# Patient Record
Sex: MALE | Race: BLACK OR AFRICAN AMERICAN | NOT HISPANIC OR LATINO | Employment: FULL TIME | URBAN - METROPOLITAN AREA
[De-identification: names, ages, dates, MRNs, and addresses within clinical notes are randomized per-mention and may not be internally consistent; named-entity substitution may affect disease eponyms.]

---

## 2020-06-12 ENCOUNTER — HOSPITAL ENCOUNTER (EMERGENCY)
Facility: HOSPITAL | Age: 35
Discharge: HOME | End: 2020-06-12
Attending: EMERGENCY MEDICINE
Payer: COMMERCIAL

## 2020-06-12 VITALS
DIASTOLIC BLOOD PRESSURE: 79 MMHG | BODY MASS INDEX: 24.96 KG/M2 | OXYGEN SATURATION: 97 % | HEIGHT: 67 IN | SYSTOLIC BLOOD PRESSURE: 118 MMHG | WEIGHT: 159 LBS | HEART RATE: 74 BPM | RESPIRATION RATE: 18 BRPM

## 2020-06-12 DIAGNOSIS — F31.9 BIPOLAR AFFECTIVE DISORDER, REMISSION STATUS UNSPECIFIED (CMS/HCC): Primary | ICD-10-CM

## 2020-06-12 DIAGNOSIS — R45.851 SUICIDAL IDEATION: ICD-10-CM

## 2020-06-12 LAB
ALBUMIN SERPL-MCNC: 4.4 G/DL (ref 3.4–5)
ALP SERPL-CCNC: 43 IU/L (ref 35–126)
ALT SERPL-CCNC: 19 IU/L (ref 16–63)
AMPHET UR QL SCN: NOT DETECTED
ANION GAP SERPL CALC-SCNC: 8 MEQ/L (ref 3–15)
APAP SERPL-MCNC: <10 UG/ML (ref 10–30)
AST SERPL-CCNC: 19 IU/L (ref 15–41)
BARBITURATES UR QL SCN: NOT DETECTED
BASOPHILS # BLD: 0 K/UL (ref 0.01–0.1)
BASOPHILS NFR BLD: 0 %
BENZODIAZ UR QL SCN: NOT DETECTED
BILIRUB SERPL-MCNC: 1 MG/DL (ref 0.3–1.2)
BUN SERPL-MCNC: 8 MG/DL (ref 8–20)
CALCIUM SERPL-MCNC: 9.3 MG/DL (ref 8.9–10.3)
CANNABINOIDS UR QL SCN: NOT DETECTED
CHLORIDE SERPL-SCNC: 107 MEQ/L (ref 98–109)
CO2 SERPL-SCNC: 23 MEQ/L (ref 22–32)
COCAINE UR QL SCN: NOT DETECTED
CREAT SERPL-MCNC: 0.9 MG/DL (ref 0.8–1.3)
DIFFERENTIAL METHOD BLD: ABNORMAL
EOSINOPHIL # BLD: 0.03 K/UL (ref 0.04–0.54)
EOSINOPHIL NFR BLD: 1 %
ERYTHROCYTE [DISTWIDTH] IN BLOOD BY AUTOMATED COUNT: 11.1 % (ref 11.6–14.4)
ETHANOL SERPL-MCNC: <5 MG/DL
GFR SERPL CREATININE-BSD FRML MDRD: >60 ML/MIN/1.73M*2
GIANT PLATELETS BLD QL SMEAR: ABNORMAL
GLUCOSE SERPL-MCNC: 96 MG/DL (ref 70–99)
HCT VFR BLDCO AUTO: 46.1 % (ref 40.1–51)
HGB BLD-MCNC: 15.8 G/DL (ref 13.7–17.5)
HYPOCHROMIA BLD QL SMEAR: ABNORMAL
LYMPHOCYTES # BLD: 1.28 K/UL (ref 1.2–3.5)
LYMPHOCYTES NFR BLD: 45 %
MCH RBC QN AUTO: 31.9 PG (ref 28–33.2)
MCHC RBC AUTO-ENTMCNC: 34.3 G/DL (ref 32.2–36.5)
MCV RBC AUTO: 93.1 FL (ref 83–98)
MONOCYTES # BLD: 0.28 K/UL (ref 0.3–1)
MONOCYTES NFR BLD: 10 %
NEUTS BAND # BLD: 1.22 K/UL (ref 1.7–7)
NEUTS SEG NFR BLD: 43 %
OPIATES UR QL SCN: NOT DETECTED
PCP UR QL SCN: NOT DETECTED
PDW BLD AUTO: 9.4 FL (ref 9.4–12.4)
PLAT MORPH BLD: NORMAL
PLATELET # BLD AUTO: 190 K/UL (ref 150–350)
PLATELET # BLD EST: ABNORMAL 10*3/UL
POTASSIUM SERPL-SCNC: 3.5 MEQ/L (ref 3.6–5.1)
PROT SERPL-MCNC: 7.4 G/DL (ref 6–8.2)
RBC # BLD AUTO: 4.95 M/UL (ref 4.5–5.8)
SALICYLATES SERPL-MCNC: <4 MG/DL
SODIUM SERPL-SCNC: 138 MEQ/L (ref 136–144)
STOMATOCYTES BLD QL SMEAR: ABNORMAL
VARIANT LYMPHS NFR BLD: 1 %
WBC # BLD AUTO: 2.84 K/UL (ref 3.8–10.5)

## 2020-06-12 PROCEDURE — 80053 COMPREHEN METABOLIC PANEL: CPT | Performed by: PHYSICIAN ASSISTANT

## 2020-06-12 PROCEDURE — 80307 DRUG TEST PRSMV CHEM ANLYZR: CPT | Performed by: PHYSICIAN ASSISTANT

## 2020-06-12 PROCEDURE — 85025 COMPLETE CBC W/AUTO DIFF WBC: CPT | Performed by: PHYSICIAN ASSISTANT

## 2020-06-12 PROCEDURE — 99283 EMERGENCY DEPT VISIT LOW MDM: CPT

## 2020-06-12 PROCEDURE — 36415 COLL VENOUS BLD VENIPUNCTURE: CPT | Performed by: PHYSICIAN ASSISTANT

## 2020-06-12 PROCEDURE — G0480 DRUG TEST DEF 1-7 CLASSES: HCPCS | Performed by: PHYSICIAN ASSISTANT

## 2020-06-12 RX ORDER — TOPIRAMATE 100 MG/1
100 TABLET, FILM COATED ORAL
COMMUNITY
Start: 2020-04-23

## 2020-06-12 RX ORDER — TOPIRAMATE 25 MG/1
25 TABLET ORAL
COMMUNITY
Start: 2020-05-05

## 2020-06-12 RX ORDER — SUMATRIPTAN SUCCINATE 25 MG/1
TABLET ORAL
COMMUNITY
Start: 2020-05-05

## 2020-06-12 ASSESSMENT — ENCOUNTER SYMPTOMS
WHEEZING: 0
SEIZURES: 0
BACK PAIN: 0
DIAPHORESIS: 0
SPEECH DIFFICULTY: 0
SORE THROAT: 0
FACIAL SWELLING: 0
AGITATION: 0
NAUSEA: 0
NECK PAIN: 0
HEADACHES: 0
VOMITING: 0
COUGH: 0
ACTIVITY CHANGE: 0
COLOR CHANGE: 0
SHORTNESS OF BREATH: 0
HEMATURIA: 0
ABDOMINAL PAIN: 0
DIFFICULTY URINATING: 0
FEVER: 0
WEAKNESS: 0
DIARRHEA: 0
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
## 2020-06-13 LAB — PATH REV BLD -IMP: NORMAL

## 2020-06-13 NOTE — DISCHARGE INSTRUCTIONS
You have contracted for safety in the emergency department.  This means that you have expressed that you are not currently suicidal and you are not currently planning on harming yourself.  If this changes at any time, please call 911 or make a family member aware.  Return to the ER at any time.
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
## 2020-06-23 ENCOUNTER — HOSPITAL ENCOUNTER (INPATIENT)
Age: 35
LOS: 6 days | Discharge: HOME | DRG: 885 | End: 2020-06-29
Attending: EMERGENCY MEDICINE | Admitting: PSYCHIATRY & NEUROLOGY
Payer: COMMERCIAL

## 2020-06-23 DIAGNOSIS — F31.11 BIPOLAR AFFECTIVE DISORDER, CURRENTLY MANIC, MILD (HCC): ICD-10-CM

## 2020-06-23 DIAGNOSIS — R45.4 DIFFICULTY CONTROLLING ANGER: Primary | ICD-10-CM

## 2020-06-23 PROBLEM — F31.9 BIPOLAR DISORDER (HCC): Status: ACTIVE | Noted: 2020-06-23

## 2020-06-23 LAB
ALBUMIN SERPL-MCNC: 3.9 G/DL (ref 3.4–5)
ALBUMIN/GLOB SERPL: 1.1 {RATIO} (ref 0.8–1.7)
ALP SERPL-CCNC: 53 U/L (ref 45–117)
ALT SERPL-CCNC: 21 U/L (ref 16–61)
AMPHET UR QL SCN: NEGATIVE
ANION GAP SERPL CALC-SCNC: 4 MMOL/L (ref 3–18)
APAP SERPL-MCNC: <2 UG/ML (ref 10–30)
AST SERPL-CCNC: 14 U/L (ref 10–38)
BARBITURATES UR QL SCN: NEGATIVE
BASOPHILS # BLD: 0 K/UL (ref 0–0.1)
BASOPHILS NFR BLD: 1 % (ref 0–2)
BENZODIAZ UR QL: NEGATIVE
BILIRUB SERPL-MCNC: 0.4 MG/DL (ref 0.2–1)
BUN SERPL-MCNC: 10 MG/DL (ref 7–18)
BUN/CREAT SERPL: 12 (ref 12–20)
CALCIUM SERPL-MCNC: 8.8 MG/DL (ref 8.5–10.1)
CANNABINOIDS UR QL SCN: NEGATIVE
CHLORIDE SERPL-SCNC: 106 MMOL/L (ref 100–111)
CO2 SERPL-SCNC: 30 MMOL/L (ref 21–32)
COCAINE UR QL SCN: NEGATIVE
COVID-19 RAPID TEST, COVR: NOT DETECTED
CREAT SERPL-MCNC: 0.84 MG/DL (ref 0.6–1.3)
DIFFERENTIAL METHOD BLD: ABNORMAL
EOSINOPHIL # BLD: 0.2 K/UL (ref 0–0.4)
EOSINOPHIL NFR BLD: 5 % (ref 0–5)
ERYTHROCYTE [DISTWIDTH] IN BLOOD BY AUTOMATED COUNT: 11.2 % (ref 11.6–14.5)
ETHANOL SERPL-MCNC: <3 MG/DL (ref 0–3)
GLOBULIN SER CALC-MCNC: 3.4 G/DL (ref 2–4)
GLUCOSE SERPL-MCNC: 90 MG/DL (ref 74–99)
HCT VFR BLD AUTO: 38.6 % (ref 36–48)
HDSCOM,HDSCOM: NORMAL
HGB BLD-MCNC: 13.4 G/DL (ref 13–16)
LYMPHOCYTES # BLD: 1.8 K/UL (ref 0.9–3.6)
LYMPHOCYTES NFR BLD: 43 % (ref 21–52)
MCH RBC QN AUTO: 31.2 PG (ref 24–34)
MCHC RBC AUTO-ENTMCNC: 34.7 G/DL (ref 31–37)
MCV RBC AUTO: 90 FL (ref 74–97)
METHADONE UR QL: NEGATIVE
MONOCYTES # BLD: 0.3 K/UL (ref 0.05–1.2)
MONOCYTES NFR BLD: 8 % (ref 3–10)
NEUTS SEG # BLD: 1.7 K/UL (ref 1.8–8)
NEUTS SEG NFR BLD: 43 % (ref 40–73)
OPIATES UR QL: NEGATIVE
PCP UR QL: NEGATIVE
PLATELET # BLD AUTO: 221 K/UL (ref 135–420)
PMV BLD AUTO: 9.8 FL (ref 9.2–11.8)
POTASSIUM SERPL-SCNC: 3.6 MMOL/L (ref 3.5–5.5)
PROT SERPL-MCNC: 7.3 G/DL (ref 6.4–8.2)
RBC # BLD AUTO: 4.29 M/UL (ref 4.7–5.5)
SALICYLATES SERPL-MCNC: <1.7 MG/DL (ref 2.8–20)
SODIUM SERPL-SCNC: 140 MMOL/L (ref 136–145)
SOURCE, COVRS: NORMAL
WBC # BLD AUTO: 4 K/UL (ref 4.6–13.2)

## 2020-06-23 PROCEDURE — 80053 COMPREHEN METABOLIC PANEL: CPT

## 2020-06-23 PROCEDURE — 99283 EMERGENCY DEPT VISIT LOW MDM: CPT

## 2020-06-23 PROCEDURE — 80307 DRUG TEST PRSMV CHEM ANLYZR: CPT

## 2020-06-23 PROCEDURE — 87635 SARS-COV-2 COVID-19 AMP PRB: CPT

## 2020-06-23 PROCEDURE — 85025 COMPLETE CBC W/AUTO DIFF WBC: CPT

## 2020-06-23 PROCEDURE — 65220000001 HC RM PRIVATE PSYCH
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
## 2020-06-24 PROBLEM — F43.12 CHRONIC POST-TRAUMATIC STRESS DISORDER (PTSD): Status: ACTIVE | Noted: 2020-06-24

## 2020-06-24 PROBLEM — F31.64 BIPOLAR DISORDER, CURR EPISODE MIXED, SEVERE, WITH PSYCHOTIC FEATURES (HCC): Status: ACTIVE | Noted: 2020-06-23

## 2020-06-24 PROCEDURE — 74011250636 HC RX REV CODE- 250/636: Performed by: PSYCHIATRY & NEUROLOGY

## 2020-06-24 PROCEDURE — 65220000003 HC RM SEMIPRIVATE PSYCH

## 2020-06-24 RX ORDER — BENZTROPINE MESYLATE 1 MG/1
1 TABLET ORAL
Status: DISCONTINUED | OUTPATIENT
Start: 2020-06-24 | End: 2020-06-29 | Stop reason: HOSPADM

## 2020-06-24 RX ORDER — DIPHENHYDRAMINE HCL 25 MG
50 CAPSULE ORAL
Status: DISCONTINUED | OUTPATIENT
Start: 2020-06-24 | End: 2020-06-29 | Stop reason: HOSPADM

## 2020-06-24 RX ORDER — LORAZEPAM 1 MG/1
1-2 TABLET ORAL
Status: DISCONTINUED | OUTPATIENT
Start: 2020-06-24 | End: 2020-06-29 | Stop reason: HOSPADM

## 2020-06-24 RX ORDER — DIPHENHYDRAMINE HYDROCHLORIDE 50 MG/ML
50 INJECTION, SOLUTION INTRAMUSCULAR; INTRAVENOUS
Status: DISCONTINUED | OUTPATIENT
Start: 2020-06-24 | End: 2020-06-29 | Stop reason: HOSPADM

## 2020-06-24 RX ORDER — HALOPERIDOL 5 MG/ML
5 INJECTION INTRAMUSCULAR
Status: DISCONTINUED | OUTPATIENT
Start: 2020-06-24 | End: 2020-06-29 | Stop reason: HOSPADM

## 2020-06-24 RX ORDER — LORAZEPAM 2 MG/ML
2 INJECTION INTRAMUSCULAR
Status: DISCONTINUED | OUTPATIENT
Start: 2020-06-24 | End: 2020-06-29 | Stop reason: HOSPADM

## 2020-06-24 RX ORDER — HALOPERIDOL 5 MG/1
5 TABLET ORAL
Status: DISCONTINUED | OUTPATIENT
Start: 2020-06-24 | End: 2020-06-29 | Stop reason: HOSPADM

## 2020-06-24 RX ORDER — TRAZODONE HYDROCHLORIDE 50 MG/1
50 TABLET ORAL
Status: DISCONTINUED | OUTPATIENT
Start: 2020-06-24 | End: 2020-06-29 | Stop reason: HOSPADM

## 2020-06-24 RX ORDER — BENZTROPINE MESYLATE 1 MG/ML
1 INJECTION INTRAMUSCULAR; INTRAVENOUS
Status: DISCONTINUED | OUTPATIENT
Start: 2020-06-24 | End: 2020-06-29 | Stop reason: HOSPADM

## 2020-06-24 RX ORDER — IBUPROFEN 600 MG/1
600 TABLET ORAL
Status: DISCONTINUED | OUTPATIENT
Start: 2020-06-24 | End: 2020-06-29 | Stop reason: HOSPADM

## 2020-06-24 RX ORDER — HYDROXYZINE PAMOATE 50 MG/1
50 CAPSULE ORAL
Status: DISCONTINUED | OUTPATIENT
Start: 2020-06-24 | End: 2020-06-29 | Stop reason: HOSPADM

## 2020-06-24 RX ADMIN — LORAZEPAM 2 MG: 2 INJECTION INTRAMUSCULAR; INTRAVENOUS at 21:41

## 2020-06-24 RX ADMIN — DIPHENHYDRAMINE HYDROCHLORIDE 50 MG: 50 INJECTION, SOLUTION INTRAMUSCULAR; INTRAVENOUS at 21:42

## 2020-06-24 RX ADMIN — HALOPERIDOL LACTATE 5 MG: 5 INJECTION, SOLUTION INTRAMUSCULAR at 21:41

## 2020-06-24 NOTE — GROUP NOTE
TIERRA  GROUP DOCUMENTATION INDIVIDUAL Group Therapy Note Date: 6/24/2020 Group Start Time: 2203 Group End Time: 3199 Group Topic: Nursing [facility] 1 SPECIAL TRTMT 1 NEETA Matos  GROUP DOCUMENTATION GROUP Group Therapy Note Attendees:2 Attendance: Did not attend Rosa Gray RN

## 2020-06-24 NOTE — BSMART NOTE
ART THERAPY GROUP PROGRESS NOTE PATIENT SCHEDULED FOR GROUP AT: 864 ATTENDANCE: 1/2 PARTICIPATION LEVEL: Participates fully in the art process ATTENTION LEVEL : Able to focus on task FOCUS: Grounding SYMBOLIC & THEMATIC CONTENT AS NOTED IN IMAGERY: He was calm, compliant, and invested in the task. He isolated himself from group members and kept to himself unless directly prompted. He made limited eye-contact and was a bit guarded. He was called out to meet with his doctor the second half of group.
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
## 2020-06-24 NOTE — BH NOTES
Patient expressed to this writer that he is confused as to why he is admitted here, he said he is trying find out who he is and where he is from, he said. \" I may be from [facility], they told me I am 34 but I am really 21years old\". He participated in all groups, staff will continue to monitor, Patient for health and safety.

## 2020-06-24 NOTE — BSMART NOTE
During admission process patient refused to give staff his cell phone and attempted to take it to his room. Patient became upset and threw phone causing it to hit the wall then the floor. Phone case noted to separate from phone during incident. Phone secured by staff.

## 2020-06-24 NOTE — BH NOTES
Patient agitated and aggressive upon admission. Attempted orient patient to situation unsuccessful. Patient agrumentative and combative. Rules of unit explained patient remained argumentative and yelling to be released. Attempted to explain TDO to patient and he remained aggressive, argumentative. Attempted to escort patient to room and was met with resistance and aggression. Supervisor notified. PRN medication drawn. Patient informed of PRN medication and agreed to go to room. Patient walked to room. Patient attempted to place his phone in his pants. He was informed that he could not keep phone and became irate. Again explained the PRN medication and benefits of medication. He threw his phone on the floor and refused medication and became hostile with 4 staff present in room to assist.  Patient was unable to be medicated, but did agree to lay in bed. Will continue to monitor and provide safety for patient and unit.

## 2020-06-24 NOTE — PROGRESS NOTES
Patient was observed earlier this morning sitting out in day area. He was staring into space and very guarded.

## 2020-06-24 NOTE — BH NOTES
During this shift ([id number]), the patient appeared to have slept 5 hours. Patient is a new admission.

## 2020-06-24 NOTE — BSMART NOTE
OCCUPATIONAL THERAPY PROGRESS NOTE Group time:7375 Any [address] The patient did not awaken/get up when called for group.

## 2020-06-24 NOTE — PROGRESS NOTES
conducted an Spirituality Group for Gustavo Alcantara, who is a 27 y. o.,male. Patient's Primary Language is: [place]. According to the patient's EMR Lutheran Affiliation is: Non Yarsani.     The reason the Patient came to the hospital is:   Patient Active Problem List    Diagnosis Date Noted    Bipolar disorder ([facility] [address] 06/23/2020         conducted Spirituality Group for ________________ who was one of ____participants. The  provided the following Interventions:  Continued the relationship of care and support. Listened empathically. Offered prayer and assurance of continued prayer on patients behalf. Chart reviewed. The following outcomes were achieved:  Patient expressed gratitude for 's visit. Assessment:  There are no further spiritual or Worship issues which require Spiritual Care Services interventions at this time. Plan:  Chaplains will continue to follow and will provide pastoral care on an as needed/requested basis.  recommends bedside caregivers page  on duty if patient shows signs of acute spiritual or emotional distress.        [address] [employer]   [phone]
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
## 2020-06-25 PROCEDURE — 65220000003 HC RM SEMIPRIVATE PSYCH

## 2020-06-25 RX ORDER — RISPERIDONE 0.5 MG/1
0.5 TABLET, ORALLY DISINTEGRATING ORAL
Status: DISCONTINUED | OUTPATIENT
Start: 2020-06-25 | End: 2020-06-26

## 2020-06-25 RX ORDER — RISPERIDONE 1 MG/1
1 TABLET, ORALLY DISINTEGRATING ORAL
Status: DISCONTINUED | OUTPATIENT
Start: 2020-06-25 | End: 2020-06-25

## 2020-06-25 NOTE — BSMART NOTE
OCCUPATIONAL THERAPY PROGRESS NOTE Group time:0814 The patient was not disturbed for group at staff discretion.

## 2020-06-25 NOTE — BH NOTES
Pt spent most of the shift in his room resting , he got up ate breakfast and lunch,he sat quietly in the day room  Starring at the other male patients,Pt returned To his room, He showered and returned to bed, Pt is a line of sight, staff will continue to monitor patient for health and safety.

## 2020-06-25 NOTE — BSMART NOTE
ART THERAPY GROUP PROGRESS NOTE Group time:945 The patient was not disturbed for group at staff discretion.

## 2020-06-25 NOTE — GROUP NOTE
TIERRA  GROUP DOCUMENTATION INDIVIDUAL Group Therapy Note Date: 6/25/2020 Group Start Time: 1300 Group End Time: 6528 Group Topic: Medication [facility] 1 SPECIAL TRTMT 1 NEETA Lopez  GROUP DOCUMENTATION GROUP Group Therapy Note Attendees: 2 Attendance: Did not attend Kennedy Iyer RN
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
## 2020-06-25 NOTE — PROGRESS NOTES
Problem: Falls - Risk of  Goal: *Absence of Falls  Description: Document Uriel Servin Fall Risk and appropriate interventions in the flowsheet. AEB remaining free of falls and wearing non skid socks daily while hospitalized. Outcome: Progressing Towards Goal  Note: Fall Risk Interventions:       Problem: Aggression and Hostility (Behavioral Health)  Goal: *Reduce intensity and frequency of aggressive behaviors and verbalizations, treating others with respect  Description: AEB reducing intensity and frequency of aggressive behaviors and verbalizations, treating others with respect daily while hospitalized. Outcome: Progressing Towards Goal  Goal: *Avoid situations that produce feelings of frustration, embarrassment, anxiety, or impatience  Description: AEB avoiding situations that produce feelings of frustration, embarrassment, anxiety, or impatience daily while hospitalized. Outcome: Progressing Towards Goal     Pt denies SI, intent, or plan. Pt also denies experiencing hallucinations of all types. Pt is calm and cooperative. Pt states, \"My name is Sari and the only voice I hear today is yours. I'm feeling good today. \" Will continue to monitor.

## 2020-06-25 NOTE — BSMART NOTE
Pt. Is a 27year old male that has Bipolar Disorder and PTSD. Pt was admitted for psychosis and bizarre behaviors. Pt.'s case was discussed in treatment team this am. Pt was medicated due to wandering in other pt. 's room. Covering SW will meet with pt at a later time. SW met with pt. Late today. Pt. Informed SW he was drawn to this area. Pt expressed he is trying to rediscover self. Pt. Arunaoctaviano Tobar he has been hospitalized before but not in this area. Pt. Was guarded, talked about memory loss, feels he needs to rediscover self . Pt. Appears manic, paranoid, has impaired insight and judgement. Covering SW will engaged pt with reality orientation. Taye Gonzalez MA, LMHP_R Covering
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
## 2020-06-26 PROCEDURE — 65220000003 HC RM SEMIPRIVATE PSYCH

## 2020-06-26 PROCEDURE — 74011250637 HC RX REV CODE- 250/637: Performed by: PSYCHIATRY & NEUROLOGY

## 2020-06-26 RX ORDER — RISPERIDONE 0.5 MG/1
1 TABLET, ORALLY DISINTEGRATING ORAL
Status: DISCONTINUED | OUTPATIENT
Start: 2020-06-26 | End: 2020-06-29 | Stop reason: HOSPADM

## 2020-06-26 RX ADMIN — RISPERIDONE 1 MG: 0.5 TABLET, ORALLY DISINTEGRATING ORAL at 20:04

## 2020-06-26 NOTE — PROGRESS NOTES
Patient has been up for most of the day. He has been observed reading magazines, some interaction with select peers. He is delusional and says bizarre things at times. He has not displayed any inappropriate behavior this shift.
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
## 2020-06-26 NOTE — BSMART NOTE
OCCUPATIONAL THERAPY PROGRESS NOTE Group time:5164 The patient was not disturbed for group at staff discretion. In his room.
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
## 2020-06-26 NOTE — BH NOTES
The writer has observed the patient's behavior throughout this shift. During this shift the patient has been calm and polite toward staff. Patient has engaged in group sessions and was active with peers. Patient was redirected for entering another patient's room but he apologized. In addition, patient has not shown any signs of aggression and was observed talking, and reading and was in compliance with scheduled medications. Will continue to monitor the patient's safety and safety locations.

## 2020-06-26 NOTE — GROUP NOTE
TIERRA  GROUP DOCUMENTATION INDIVIDUAL Group Therapy Note Date: 6/26/2020 Group Start Time: 9822 Group End Time: 4968 Group Topic: Nursing [facility] 1 SPECIAL TRTMT 1 NEETA Pugh  GROUP DOCUMENTATION GROUP Group Therapy Note Attendees: 3 Attendance: Did not attend Godwin Frausto RN

## 2020-06-27 PROCEDURE — 74011250637 HC RX REV CODE- 250/637: Performed by: PSYCHIATRY & NEUROLOGY

## 2020-06-27 PROCEDURE — 65220000003 HC RM SEMIPRIVATE PSYCH

## 2020-06-27 RX ADMIN — HALOPERIDOL 5 MG: 5 TABLET ORAL at 06:52

## 2020-06-27 RX ADMIN — RISPERIDONE 1 MG: 0.5 TABLET, ORALLY DISINTEGRATING ORAL at 20:21

## 2020-06-27 RX ADMIN — LORAZEPAM 2 MG: 1 TABLET ORAL at 06:52
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
## 2020-06-27 NOTE — GROUP NOTE
TIERRA  GROUP DOCUMENTATION INDIVIDUAL Group Therapy Note Date: 6/27/2020 Group Start Time: 9735 Group End Time: 5974 Group Topic: Nursing [facility] 1 SPECIAL TRTMT 1 NEETA Reeves GROUP DOCUMENTATION GROUP Group Therapy Note Attendees: 3 Attendance: Did not attend Rosa Elena Esquivel RN
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
## 2020-06-27 NOTE — BH NOTES
Patient noted pacing around his room mumbling, talking and answering himself in a angry tone on voice. Other times laughing inappropriately out loud. No sexual inappropriate behavior noted as far by this writer. Will continue all precautions as order and approach for one to one interactions as patient warrants. Continue to provide a safe and structured environment.

## 2020-06-27 NOTE — GROUP NOTE
TIERRA WALTON GROUP DOCUMENTATION INDIVIDUAL Group Therapy Note Date: 6/27/2020 Group Start Time: 1397 Group End Time: 3275 Group Topic: Nursing [facility] 1 SPECIAL TRTMT 1 NEETA Prasad GROUP DOCUMENTATION GROUP Group Therapy Note Attendees:  3 Attendance: Did not attend

## 2020-06-27 NOTE — BH NOTES
Patient was given ativan 2 mg po and haldol 5 mg po at 0652 for agitation and responding to internal stimuli. Patient remains on line of sight while awake.

## 2020-06-28 LAB
CHOLEST SERPL-MCNC: 132 MG/DL
HBA1C MFR BLD: 4.4 % (ref 4.2–5.6)
HDLC SERPL-MCNC: 36 MG/DL (ref 40–60)
HDLC SERPL: 3.7 {RATIO} (ref 0–5)
LDLC SERPL CALC-MCNC: 85.8 MG/DL (ref 0–100)
LIPID PROFILE,FLP: ABNORMAL
TRIGL SERPL-MCNC: 51 MG/DL (ref ?–150)
TSH SERPL DL<=0.05 MIU/L-ACNC: 1.22 UIU/ML (ref 0.36–3.74)
VLDLC SERPL CALC-MCNC: 10.2 MG/DL

## 2020-06-28 PROCEDURE — 84443 ASSAY THYROID STIM HORMONE: CPT

## 2020-06-28 PROCEDURE — 36415 COLL VENOUS BLD VENIPUNCTURE: CPT

## 2020-06-28 PROCEDURE — 65220000003 HC RM SEMIPRIVATE PSYCH

## 2020-06-28 PROCEDURE — 80061 LIPID PANEL: CPT

## 2020-06-28 PROCEDURE — 83036 HEMOGLOBIN GLYCOSYLATED A1C: CPT

## 2020-06-28 PROCEDURE — 74011250637 HC RX REV CODE- 250/637: Performed by: PSYCHIATRY & NEUROLOGY

## 2020-06-28 RX ADMIN — RISPERIDONE 1 MG: 0.5 TABLET, ORALLY DISINTEGRATING ORAL at 20:35

## 2020-06-28 NOTE — PROGRESS NOTES
[address]  Inpatient Progress Note     Date of Service: 06/28/20  Hospital Day: 5     Subjective/Interval History   06/28/20    Treatment Team Notes:  Notes reviewed and/or discussed and report that Khadra Bingham is a pt with a hx of bipolar disorder, psychotic upon admission, showing some evidence of improvement. Patient interview: Khadra Bingham was interviewed by this writer today. The pt was heard speaking loudly in his room. When the undersigned approach him, he had a book on his hands giving the impression that he was reading. However based upon what he was saying, it appears that he was responding to redIT, rather than reading a passage from the book on his hands. Objective     Visit Vitals  /74 (BP 1 Location: Right arm, BP Patient Position: Sitting)   Pulse 80   Temp 97 °F (36.1 °C)   Resp 18   SpO2 98%     Vitals are stable. Recent Results (from the past 24 hour(s))   HEMOGLOBIN A1C W/O EAG    Collection Time: 06/28/20  6:29 AM   Result Value Ref Range    Hemoglobin A1c 4.4 4.2 - 5.6 %   LIPID PANEL    Collection Time: 06/28/20  6:29 AM   Result Value Ref Range    LIPID PROFILE          Cholesterol, total 132 <200 MG/DL    Triglyceride 51 <150 MG/DL    HDL Cholesterol 36 (L) 40 - 60 MG/DL    LDL, calculated 85.8 0 - 100 MG/DL    VLDL, calculated 10.2 MG/DL    CHOL/HDL Ratio 3.7 0 - 5.0     TSH 3RD GENERATION    Collection Time: 06/28/20  6:29 AM   Result Value Ref Range    TSH 1.22 0.36 - 3.74 uIU/mL     Above results noticed. Mental Status Examination     Appearance/Hygiene 27 y.o. BLACK OR  male  Hygiene: limited. Behavior/Social Relatedness Improving, staff described his not showing not appropriate sexual behaviors for the last couple of days.    Musculoskeletal Gait/Station: appropriate  Tone (flaccid, cogwheeling, spastic): not assessed  Psychomotor (hyperkinetic, hypokinetic): calmer  Involuntary movements (tics, dyskinesias, akathisa, stereotypies): none   Speech   Rate, rhythm, volume, fluency and articulation are appropriate   Mood   Mixed. Affect    Less labile. Thought Process Linear and goal directed   Thought Content and Perceptual Disturbances Denies self-injurious behavior (SIB), suicidal ideation (SI), aggressive behavior or homicidal ideation (HI)    Denies auditory and visual hallucinations, however possibly hallucinating still. Sensorium and Cognition  Grossly intact   Insight  Improving. Judgment Improving. Assessment/Plan      Psychiatric Diagnoses:   Patient Active Problem List   Diagnosis Code    Bipolar disorder, curr episode mixed, severe, with psychotic features ([facility] [address] F31.64    Chronic post-traumatic stress disorder (PTSD) F43.12       Medical Diagnoses: above results are normal with the exception of lower HDL results. Psychosocial and contextual factors: per attending. Level of impairment/disability: high. 1.  Not drowsy today, tolerating risperidone better. May require a dose increase. 2.  Reviewed instructions, risks, benefits and side effects of medications  3. Disposition/Discharge Date: self-care/home, per attending.     Althea Soriano MD, [address] [place] [address]  Psychiatry

## 2020-06-28 NOTE — BH NOTES
Patient has remained on line of sight while awake. No inappropriate behaviors noted. Pleasant and in good behavioral control. Compliant with medications. Noted pacing in hallway at times. Attended group activities. Will continue to monitor.

## 2020-06-28 NOTE — GROUP NOTE
TIERRA  GROUP DOCUMENTATION INDIVIDUAL Group Therapy Note Date: 6/27/2020 Group Start Time: 2000 Group End Time: 2030 Group Topic: Nursing [facility] SPECIAL TRTMT 1 Randall Torres, RN 
 
IP  GROUP DOCUMENTATION GROUP Group Therapy Note Attendees: 5 Attendance: Attended Interventions/techniques: Challenged and Informed Follows Directions: Followed directions Interactions: Interacted appropriately Mental Status: Calm Behavior/appearance: Cooperative Goals Achieved: Able to engage in interactions and Able to listen to others Nevin Lawrence
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
## 2020-06-28 NOTE — PROGRESS NOTES
06/28/20 0849   Group Therapy   Group Nursing   Attendance Attended   Number of participants 5   Time in 0800   Time out 0830   Total Time 30   Interventions/techniques Informed;Provided feedback   Follows directions Followed directions   Interactions Interacted appropriately   Mental Status Congruent;Calm   Behavior/appearance Attentive

## 2020-06-29 VITALS
HEART RATE: 99 BPM | OXYGEN SATURATION: 98 % | RESPIRATION RATE: 20 BRPM | DIASTOLIC BLOOD PRESSURE: 79 MMHG | TEMPERATURE: 98 F | SYSTOLIC BLOOD PRESSURE: 111 MMHG

## 2020-06-29 RX ORDER — RISPERIDONE 1 MG/1
1 TABLET, ORALLY DISINTEGRATING ORAL
Qty: 30 TAB | Refills: 1 | Status: SHIPPED | OUTPATIENT
Start: 2020-06-29

## 2020-06-29 RX ORDER — TRAZODONE HYDROCHLORIDE 50 MG/1
50 TABLET ORAL
Qty: 30 TAB | Refills: 1 | Status: SHIPPED | OUTPATIENT
Start: 2020-06-29

## 2020-06-29 NOTE — BH NOTES
Patient participated in all  Groups he was in good spirits throughout the shift, he was med compliant, pt did not display any negative or inappropriate behavior. Staff will continue to monitor patient for health and safety.

## 2020-06-29 NOTE — GROUP NOTE
[facility]  GROUP DOCUMENTATION INDIVIDUAL Group Therapy Note Date: 6/28/2020 Group Start Time: 0 Group End Time: 2000 Group Topic: Nursing [facility] 1 SPECIAL TRTMT 1 Milad Skelton RN 
 
[facility] GROUP DOCUMENTATION GROUP Group Therapy Note Attendees: 6 Attendance: Attended Interventions/techniques: Challenged and Informed Follows Directions: Did not follow directions Interactions: Interacted appropriately Mental Status: Calm Behavior/appearance: Attentive and Cooperative Goals Achieved: Able to engage in interactions, Able to listen to others and Able to give feedback to another Caro Wilson

## 2020-06-29 NOTE — BSMART NOTE
ART THERAPY GROUP PROGRESS NOTE PATIENT SCHEDULED FOR GROUP AT: 7058 ATTENDANCE: 1/4 PARTICIPATION LEVEL: Does not engage in the art process or gives up easily. ATTENTION LEVEL : Unable to attend to task at hand. FOCUS: Grounding SYMBOLIC & THEMATIC CONTENT AS NOTED IN IMAGERY: He joined for about 1/4 of group, however lacked investment and appeared distracted. He presented with a brighter mood than noted in last week's group, however he was not invested and left without warning.

## 2020-06-29 NOTE — BH NOTES
The patient was monitored for safety. Affect was brighter, less intrusive but still needed encouraging to watch his boundaries with others. Pt socialized appropriately with his peers and the staff. Pt did eat at all meals. Pt did attend his groups and activities. Pt did talk with his DR. No issues with any outbursts. Staff will continue to monitor for safety and locations.

## 2020-06-29 NOTE — PROGRESS NOTES
Pt's prescription  And discharge instruction given. Pt. Parker Weiss understanding about his medications and follow up with his Psychiatrist.  Pt's wrist band discarded, valuables given. Pt.escorted out of the unit ambulatory by staff.
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
